# Patient Record
Sex: MALE | Race: WHITE | NOT HISPANIC OR LATINO | Employment: FULL TIME | URBAN - METROPOLITAN AREA
[De-identification: names, ages, dates, MRNs, and addresses within clinical notes are randomized per-mention and may not be internally consistent; named-entity substitution may affect disease eponyms.]

---

## 2020-01-25 ENCOUNTER — OFFICE VISIT (OUTPATIENT)
Dept: URGENT CARE | Facility: CLINIC | Age: 30
End: 2020-01-25
Payer: COMMERCIAL

## 2020-01-25 VITALS
HEIGHT: 69 IN | TEMPERATURE: 99.1 F | RESPIRATION RATE: 18 BRPM | OXYGEN SATURATION: 96 % | BODY MASS INDEX: 30.07 KG/M2 | SYSTOLIC BLOOD PRESSURE: 104 MMHG | DIASTOLIC BLOOD PRESSURE: 61 MMHG | HEART RATE: 100 BPM | WEIGHT: 203 LBS

## 2020-01-25 DIAGNOSIS — J02.9 ACUTE PHARYNGITIS, UNSPECIFIED ETIOLOGY: Primary | ICD-10-CM

## 2020-01-25 DIAGNOSIS — B34.9 VIRAL ILLNESS: ICD-10-CM

## 2020-01-25 LAB — S PYO AG THROAT QL: NEGATIVE

## 2020-01-25 PROCEDURE — 87880 STREP A ASSAY W/OPTIC: CPT | Performed by: FAMILY MEDICINE

## 2020-01-25 PROCEDURE — G0382 LEV 3 HOSP TYPE B ED VISIT: HCPCS | Performed by: FAMILY MEDICINE

## 2020-01-25 PROCEDURE — 99283 EMERGENCY DEPT VISIT LOW MDM: CPT | Performed by: FAMILY MEDICINE

## 2020-01-25 RX ORDER — BUPRENORPHINE HYDROCHLORIDE AND NALOXONE HYDROCHLORIDE DIHYDRATE 8; 2 MG/1; MG/1
1 TABLET SUBLINGUAL DAILY
COMMUNITY
End: 2020-09-04

## 2020-01-25 RX ORDER — TRAZODONE HYDROCHLORIDE 100 MG/1
100 TABLET ORAL
COMMUNITY
End: 2022-03-07 | Stop reason: ALTCHOICE

## 2020-01-25 RX ORDER — HYDROXYZINE 50 MG/1
50 TABLET, FILM COATED ORAL 3 TIMES DAILY PRN
COMMUNITY
End: 2022-03-07 | Stop reason: ALTCHOICE

## 2020-01-25 NOTE — PATIENT INSTRUCTIONS
F/u as needed  Most likely viral   Supportive care  OTC meds as needed  F/u with PCP if no improvement    Strep neg

## 2020-01-25 NOTE — PROGRESS NOTES
NAME: Agus Shepard is a 34 y o  male  : 1990    MRN: [id number]      Assessment and Plan   Acute pharyngitis, unspecified etiology [J02 9]  1  Acute pharyngitis, unspecified etiology  POCT rapid strepA   2  Viral illness             Patient Instructions   Patient Instructions   F/u as needed  Most likely viral   Supportive care  OTC meds as needed  F/u with PCP if no improvement  Strep neg    Proceed to ER if symptoms worsen  Chief Complaint     Chief Complaint   Patient presents with    Cold Like Symptoms     Patient here with sinus pressure and a headache  He took Ibuprofen for the headache  History of Present Illness   Here c/o dizziness, HA  Facial pressure  Symptoms for 2 days  Fever last night  Took ibuprofen and mucinex  Coughing  Sore throat  Denies SOB  Body aches      Review of Systems   Review of Systems   Constitutional: Positive for fever  Negative for fatigue  HENT: Positive for sore throat  Negative for ear pain and trouble swallowing  Respiratory: Positive for cough  Negative for chest tightness and shortness of breath  Cardiovascular: Negative for chest pain  Gastrointestinal: Positive for vomiting  Negative for abdominal pain, diarrhea and nausea  Genitourinary: Negative for difficulty urinating and dysuria  Skin: Negative for rash and wound  Neurological: Positive for weakness and headaches  Current Medications       Current Outpatient Medications:     buprenorphine-naloxone (SUBOXONE) 8-2 mg per SL tablet, Place 1 tablet under the tongue daily, Disp: , Rfl:     hydrOXYzine HCL (ATARAX) 50 mg tablet, Take 50 mg by mouth 3 (three) times a day as needed for itching, Disp: , Rfl:     traZODone (DESYREL) 100 mg tablet, Take 100 mg by mouth daily at bedtime, Disp: , Rfl:     Current Allergies     Allergies as of 2020    (No Known Allergies)              Past Medical History:   Diagnosis Date    Anxiety        History reviewed   No pertinent surgical history  History reviewed  No pertinent family history  Medications have been verified  The following portions of the patient's history were reviewed and updated as appropriate: allergies, current medications, past family history, past medical history, past social history, past surgical history and problem list     Objective   /61   Pulse 100   Temp 99 1 °F (37 3 °C) (Tympanic)   Resp 18   Ht 5' 9" (1 753 m)   Wt 92 1 kg (203 lb)   SpO2 96%   BMI 29 98 kg/m²      Physical Exam     Physical Exam   Constitutional: He is oriented to person, place, and time  He appears well-developed and well-nourished  HENT:   Head: Normocephalic  Mouth/Throat: No oropharyngeal exudate  Eyes: EOM are normal    Neck: Normal range of motion  Cardiovascular: Normal rate, regular rhythm and normal heart sounds  Exam reveals no gallop and no friction rub  No murmur heard  Pulmonary/Chest: Effort normal and breath sounds normal  No respiratory distress  He has no wheezes  He has no rales  Abdominal: Soft  Bowel sounds are normal  He exhibits no distension  There is no tenderness  There is no rebound and no guarding  Musculoskeletal: Normal range of motion  Lymphadenopathy:     He has cervical adenopathy  Neurological: He is oriented to person, place, and time  Skin: Skin is warm and dry  No rash noted  Psychiatric: He has a normal mood and affect  Thought content normal    Nursing note and vitals reviewed

## 2020-07-07 ENCOUNTER — OFFICE VISIT (OUTPATIENT)
Dept: URGENT CARE | Facility: CLINIC | Age: 30
End: 2020-07-07
Payer: COMMERCIAL

## 2020-07-07 VITALS
TEMPERATURE: 97.3 F | DIASTOLIC BLOOD PRESSURE: 76 MMHG | BODY MASS INDEX: 27.91 KG/M2 | HEIGHT: 69 IN | SYSTOLIC BLOOD PRESSURE: 130 MMHG | OXYGEN SATURATION: 99 % | HEART RATE: 106 BPM | WEIGHT: 188.4 LBS | RESPIRATION RATE: 16 BRPM

## 2020-07-07 DIAGNOSIS — S05.02XA ABRASION OF LEFT CORNEA, INITIAL ENCOUNTER: Primary | ICD-10-CM

## 2020-07-07 PROCEDURE — 99283 EMERGENCY DEPT VISIT LOW MDM: CPT | Performed by: PREVENTIVE MEDICINE

## 2020-07-07 PROCEDURE — G0382 LEV 3 HOSP TYPE B ED VISIT: HCPCS | Performed by: PREVENTIVE MEDICINE

## 2020-07-07 RX ORDER — TOBRAMYCIN 3 MG/ML
2 SOLUTION/ DROPS OPHTHALMIC
Status: DISCONTINUED | OUTPATIENT
Start: 2020-07-07 | End: 2020-09-04

## 2020-07-08 NOTE — PATIENT INSTRUCTIONS
Use the drops 4 times a day for the next 2 days  If the eye feels irritated tonight you can tape the lid shut     Recheck if no improvement in 48 hours

## 2020-07-08 NOTE — PROGRESS NOTES
[facility]        NAME: Delano Nuno is a 27 y o  male  : 1990    MRN: [id number]  DATE: 2020  TIME: 8:52 PM    Assessment and Plan   Abrasion of left cornea, initial encounter [S05  02XA]  1  Abrasion of left cornea, initial encounter           Patient Instructions       Follow up with PCP in 3-5 days  Proceed to  ER if symptoms worsen  Chief Complaint     Chief Complaint   Patient presents with    Foreign Body in Eye     Pt working on car, fb entered L eye  Pt reports being able to see the fb, discomfort 6/10  Pt irrigated eye with saline  History of Present Illness       Patient was working on a car when he felt a foreign body and reside  Review of Systems   Review of Systems   Eyes: Positive for pain  Current Medications       Current Outpatient Medications:     buprenorphine-naloxone (SUBOXONE) 8-2 mg per SL tablet, Place 1 tablet under the tongue daily, Disp: , Rfl:     hydrOXYzine HCL (ATARAX) 50 mg tablet, Take 50 mg by mouth 3 (three) times a day as needed for itching, Disp: , Rfl:     traZODone (DESYREL) 100 mg tablet, Take 100 mg by mouth daily at bedtime, Disp: , Rfl:     Current Allergies     Allergies as of 2020    (No Known Allergies)            The following portions of the patient's history were reviewed and updated as appropriate: allergies, current medications, past family history, past medical history, past social history, past surgical history and problem list      Past Medical History:   Diagnosis Date    Anxiety        Past Surgical History:   Procedure Laterality Date    WISDOM TOOTH EXTRACTION         History reviewed  No pertinent family history  Medications have been verified          Objective   /76 (BP Location: Right arm, Patient Position: Sitting)   Pulse (!) 106   Temp (!) 97 3 °F (36 3 °C) (Oral)   Resp 16   Ht 5' 9" (1 753 m)   Wt 85 5 kg (188 lb 6 4 oz)   SpO2 99%   BMI 27 82 kg/m²        Physical Exam Physical Exam   Eyes:   No foreign body noted with the naked eye over the sclera the conjunctiva work or under the upper or lower lid  Fluorescein stain reveals a corneal abrasion over the lower pupil area

## 2020-09-04 ENCOUNTER — OFFICE VISIT (OUTPATIENT)
Dept: FAMILY MEDICINE CLINIC | Facility: CLINIC | Age: 30
End: 2020-09-04
Payer: COMMERCIAL

## 2020-09-04 VITALS
DIASTOLIC BLOOD PRESSURE: 86 MMHG | SYSTOLIC BLOOD PRESSURE: 146 MMHG | WEIGHT: 177.8 LBS | HEART RATE: 100 BPM | BODY MASS INDEX: 26.33 KG/M2 | HEIGHT: 69 IN | OXYGEN SATURATION: 99 % | TEMPERATURE: 98 F

## 2020-09-04 DIAGNOSIS — Z13.6 SCREENING FOR CARDIOVASCULAR CONDITION: ICD-10-CM

## 2020-09-04 DIAGNOSIS — Z11.4 SCREENING FOR HIV (HUMAN IMMUNODEFICIENCY VIRUS): ICD-10-CM

## 2020-09-04 DIAGNOSIS — Z13.1 SCREENING FOR DIABETES MELLITUS: ICD-10-CM

## 2020-09-04 DIAGNOSIS — B18.2 CHRONIC HEPATITIS C WITHOUT HEPATIC COMA (HCC): Primary | ICD-10-CM

## 2020-09-04 DIAGNOSIS — Z13.29 SCREENING FOR THYROID DISORDER: ICD-10-CM

## 2020-09-04 PROCEDURE — 99203 OFFICE O/P NEW LOW 30 MIN: CPT | Performed by: NURSE PRACTITIONER

## 2020-09-04 PROCEDURE — 3725F SCREEN DEPRESSION PERFORMED: CPT | Performed by: NURSE PRACTITIONER

## 2020-09-04 NOTE — PROGRESS NOTES
Assessment/Plan   Problem List Items Addressed This Visit     None      Visit Diagnoses     Chronic hepatitis C without hepatic coma ([facility] [address] )    -  Primary    Relevant Orders    Ambulatory referral to Gastroenterology    Screening for cardiovascular condition        Relevant Orders    CBC and differential    Lipid Panel with Direct LDL reflex    Screening for diabetes mellitus        Relevant Orders    Comprehensive metabolic panel    Screening for HIV (human immunodeficiency virus)        Relevant Orders    Human Immunodeficiency Virus 1/2 Antigen / Antibody ( Fourth Generation) with Reflex Testing    Screening for thyroid disorder        Relevant Orders    TSH, 3rd generation with Free T4 reflex                Chief Complaint   Patient presents with    new patient     stablish care     Anxiety       Subjective   Patient ID: Geovanny Gan is a 27 y o  male  Vitals:    09/04/20 0809   BP: 146/86   Pulse: 100   Temp: 98 °F (36 7 °C)   SpO2: 99%     Here today to establish care with this practice  He has recently moved back into this area  Does have a history of substance abuse was on suboxone but stopped 3 months ago, did not offer reason  Reports has been "clean" for many moths     Does admit to a history of Hep C - referral to GI for treatment        Anxiety   Presents for initial visit  Onset was 1 to 6 months ago  The problem has been waxing and waning  Symptoms include compulsions, decreased concentration, depressed mood, excessive worry, insomnia, irritability, muscle tension, nervous/anxious behavior, palpitations and panic  Symptoms occur most days  The severity of symptoms is moderate  The symptoms are aggravated by work stress  The quality of sleep is fair  Nighttime awakenings: occasional      Risk factors: history of sunbtance abuse  His past medical history is significant for anxiety/panic attacks  There is no history of suicide attempts  Past treatments include SSRIs and lifestyle changes   The treatment provided no relief  Compliance with prior treatments has been variable  Past compliance problems: Patient has stopped taking all previously prescribed medication for anxiety, he does have an appointment with [facility] on 3  days will await their guidance patient does not want medications to be initiated at this time  The following portions of the patient's history were reviewed and updated as appropriate: allergies, current medications, past family history, past social history, past surgical history and problem list     Review of Systems   Constitutional: Positive for irritability  Negative for fatigue and fever  HENT: Negative  Eyes: Negative  Respiratory: Negative  Cardiovascular: Positive for palpitations  Gastrointestinal: Negative  Endocrine: Negative  Genitourinary: Negative  Musculoskeletal: Negative  Allergic/Immunologic: Negative  Neurological: Negative  Hematological: Negative  Psychiatric/Behavioral: Positive for decreased concentration  The patient is nervous/anxious and has insomnia  Objective     Physical Exam  Vitals signs and nursing note reviewed  Constitutional:       General: He is in acute distress (anxious)  Appearance: Normal appearance  He is normal weight  HENT:      Head: Normocephalic and atraumatic  Right Ear: Tympanic membrane, ear canal and external ear normal  There is no impacted cerumen  Left Ear: Tympanic membrane, ear canal and external ear normal  There is no impacted cerumen  Nose: Nose normal    Eyes:      General:         Right eye: No discharge  Left eye: No discharge  Extraocular Movements: Extraocular movements intact  Conjunctiva/sclera: Conjunctivae normal    Neck:      Musculoskeletal: Normal range of motion and neck supple  Cardiovascular:      Rate and Rhythm: Normal rate and regular rhythm     Pulmonary:      Effort: Pulmonary effort is normal  No respiratory distress  Breath sounds: Normal breath sounds  No wheezing  Abdominal:      General: Abdomen is flat  Bowel sounds are normal       Palpations: Abdomen is soft  Musculoskeletal: Normal range of motion  Skin:     General: Skin is warm and dry  Neurological:      Mental Status: He is alert and oriented to person, place, and time  Psychiatric:         Attention and Perception: Attention and perception normal          Mood and Affect: Mood is anxious  Speech: Speech normal          Behavior: Behavior normal  Behavior is cooperative  Thought Content: Thought content normal          Cognition and Memory: Cognition and memory normal          Judgment: Judgment normal  Judgment is not impulsive or inappropriate        Comments: Feels the need to pace to relieve nervous energy

## 2020-09-04 NOTE — PATIENT INSTRUCTIONS
Anxiety   WHAT YOU NEED TO KNOW:   Anxiety is a condition that causes you to feel extremely worried or nervous  The feelings are so strong that they can cause problems with your daily activities or sleep  Anxiety may be triggered by something you fear, or it may happen without a cause  Family or work stress, smoking, caffeine, and alcohol can increase your risk for anxiety  Certain medicines or health conditions can also increase your risk  Anxiety can become a long-term condition if it is not managed or treated  DISCHARGE INSTRUCTIONS:   Call 911 if:   · You have chest pain, tightness, or heaviness that may spread to your shoulders, arms, jaw, neck, or back  · You feel like hurting yourself or someone else  Contact your healthcare provider if:   · Your symptoms get worse or do not get better with treatment  · Your anxiety keeps you from doing your regular daily activities  · You have new symptoms since your last visit  · You have questions or concerns about your condition or care  Medicines:   · Medicines  may be given to help you feel more calm and relaxed, and decrease your symptoms  · Take your medicine as directed  Contact your healthcare provider if you think your medicine is not helping or if you have side effects  Tell him of her if you are allergic to any medicine  Keep a list of the medicines, vitamins, and herbs you take  Include the amounts, and when and why you take them  Bring the list or the pill bottles to follow-up visits  Carry your medicine list with you in case of an emergency  Follow up with your healthcare provider within 2 weeks or as directed:  Write down your questions so you remember to ask them during your visits  Manage anxiety:   · Talk to someone about your anxiety  Your healthcare provider may suggest counseling  Cognitive behavioral therapy can help you understand and change how you react to events that trigger your symptoms   You might feel more comfortable talking with a friend or family member about your anxiety  Choose someone you know will be supportive and encouraging  · Find ways to relax  Activities such as exercise, meditation, or listening to music can help you relax  Spend time with friends, or do things you enjoy  · Practice deep breathing  Deep breathing can help you relax when you feel anxious  Focus on taking slow, deep breaths several times a day, or during an anxiety attack  Breathe in through your nose and out through your mouth  · Create a regular sleep routine  Regular sleep can help you feel calmer during the day  Go to sleep and wake up at the same times every day  Do not watch television or use the computer right before bed  Your room should be comfortable, dark, and quiet  · Eat a variety of healthy foods  Healthy foods include fruits, vegetables, low-fat dairy products, lean meats, fish, whole-grain breads, and cooked beans  Healthy foods can help you feel less anxious and have more energy  · Exercise regularly  Exercise can increase your energy level  Exercise may also lift your mood and help you sleep better  Your healthcare provider can help you create an exercise plan  · Do not smoke  Nicotine and other chemicals in cigarettes and cigars can increase anxiety  Ask your healthcare provider for information if you currently smoke and need help to quit  E-cigarettes or smokeless tobacco still contain nicotine  Talk to your healthcare provider before you use these products  · Do not have caffeine  Caffeine can make your symptoms worse  Do not have foods or drinks that are meant to increase your energy level  · Limit or do not drink alcohol  Ask your healthcare provider if alcohol is safe for you  You may not be able to drink alcohol if you take certain anxiety or depression medicines  Limit alcohol to 1 drink per day if you are a woman  Limit alcohol to 2 drinks per day if you are a man   A drink of alcohol is 12 ounces of beer, 5 ounces of wine, or 1½ ounces of liquor  · Do not use drugs  Drugs can make your anxiety worse  It can also make anxiety hard to manage  Talk to your healthcare provider if you use drugs and want help to quit  © 2017 [address] Information is for End User's use only and may not be sold, redistributed or otherwise used for commercial purposes  All illustrations and images included in CareNotes® are the copyrighted property of A D A M , [employer]  or Kane Cassidy  The above information is an  only  It is not intended as medical advice for individual conditions or treatments  Talk to your doctor, nurse or pharmacist before following any medical regimen to see if it is safe and effective for you

## 2020-09-15 ENCOUNTER — CONSULT (OUTPATIENT)
Dept: GASTROENTEROLOGY | Facility: CLINIC | Age: 30
End: 2020-09-15
Payer: COMMERCIAL

## 2020-09-15 VITALS
SYSTOLIC BLOOD PRESSURE: 140 MMHG | HEIGHT: 69 IN | WEIGHT: 182 LBS | BODY MASS INDEX: 26.96 KG/M2 | DIASTOLIC BLOOD PRESSURE: 78 MMHG

## 2020-09-15 DIAGNOSIS — B18.2 CHRONIC HEPATITIS C WITHOUT HEPATIC COMA (HCC): ICD-10-CM

## 2020-09-15 DIAGNOSIS — R63.4 WEIGHT LOSS: Primary | ICD-10-CM

## 2020-09-15 PROCEDURE — 99243 OFF/OP CNSLTJ NEW/EST LOW 30: CPT | Performed by: NURSE PRACTITIONER

## 2020-09-15 NOTE — PROGRESS NOTES
[facility] - Outpatient Consultation  Vicki Rudd 27 y o  male MRN: [id number]  Encounter: [id number]    ASSESSMENT AND PLAN:      1  Chronic hepatitis C without hepatic coma (HCC)  Hepatitis-C antibody and Hep C viral load positive in 2019  He has not received any treatment for hepatitis C in the past   Patient has a history of previous IV heroin abuse and was previously on Suboxone (he stopped taking this medication about 3 months ago)  His last IV drug use was in October 2018  He also was incarcerated for 14 months  Will check:   - Ambulatory referral to Gastroenterology  - CBC and differential; Future  - Hepatitis C genotype; Future  - Hepatitis C antibody; Future  - Hepatitis C RNA, quantitative, PCR; Future  - US abdomen complete; Future  - Hepatitis A antibody, total; Future  - Hepatitis B core antibody, total; Future  - Hepatitis B surface antibody; Future  - Hepatitis B surface antigen; Future  - Human Immunodeficiency Virus 1/2 Antigen / Antibody ( Fourth Generation) with Reflex Testing; Future  - HCV FIBROSURE; Future  - Protime-INR; Future    Based on lab results, will determine appropriate Hep C treatment  2  Weight loss  Patient reports recent 15 lb weight loss in the past several months  He does report that he started a new job in a [employer] and is eating less throughout the day  - Comprehensive metabolic panel; Future  - TSH, 3rd generation with Free T4 reflex; Future  - will continue to monitor weight closely       Followup Appointment: 8 weeks   ______________________________________________________________________    Chief Complaint   Patient presents with    Hep b treatment       HPI:   Vicki Rudd is a 27y o  year old male who presents to the office today as a new patient  He was referred to us by his PCP Zelalem May, [address]  He reports a history of Hepatitis C (had labwork in 2019)   He was previously incarcerated for approximately 14 months and also has a history of previous IV drug abuse (heroin) and was previously on Suboxone  He also has several tattoos  He is currently taking Trazodone and Atarax  He reports that he had lab work completed when he was released from retirement and was told that Hep C antibodies were positive  He has not been treated for Hep C in the past  He denies any fever, chills, upper GI symptoms, jaundice, abdominal pain, nausea, vomiting, change in bowel habits, hematochezia, melena  Historical Information   Past Medical History:   Diagnosis Date    Anxiety      Past Surgical History:   Procedure Laterality Date    WISDOM TOOTH EXTRACTION       Social History     Substance and Sexual Activity   Alcohol Use Never    Frequency: Never     Social History     Substance and Sexual Activity   Drug Use Never     Social History     Tobacco Use   Smoking Status Current Every Day Smoker    Packs/day: 0 25    Types: Cigarettes   Smokeless Tobacco Former User     Family History   Adopted: Yes       Meds/Allergies     Current Outpatient Medications:     hydrOXYzine HCL (ATARAX) 50 mg tablet    traZODone (DESYREL) 100 mg tablet    No Known Allergies    PHYSICAL EXAM:    Blood pressure 140/78, height 5' 9" (1 753 m), weight 82 6 kg (182 lb)  Body mass index is 26 88 kg/m²  General Appearance: NAD, cooperative, alert  Eyes: Anicteric, PERRLA, EOMI  ENT:  Normocephalic, atraumatic, normal mucosa  Neck:  Supple, symmetrical, trachea midline,   Resp:  Clear to auscultation bilaterally; no rales, rhonchi or wheezing; respirations unlabored   CV:  S1 S2, Regular rate and rhythm; no murmur, rub, or gallop  GI:  Soft, non-tender, non-distended; normal bowel sounds; no masses, no organomegaly   Rectal: Deferred  Musculoskeletal: No cyanosis, clubbing or edema  Normal ROM    Skin:  No jaundice, rashes, or lesions   Heme/Lymph: No palpable cervical lymphadenopathy  Psych: Normal affect, good eye contact, +anxious   Neuro: No gross deficits, AAOx3    Lab Results:   No results found for: WBC, HGB, HCT, MCV, PLT  No results found for: NA, K, CL, CO2, ANIONGAP, BUN, CREATININE, GLUCOSE, GLUF, CALCIUM, CORRECTEDCA, AST, ALT, ALKPHOS, PROT, BILITOT, EGFR  No results found for: IRON, TIBC, FERRITIN  No results found for: LIPASE    Radiology Results:   No results found  REVIEW OF SYSTEMS:    CONSTITUTIONAL: Denies any fever, chills, rigors, and weight loss  HEENT: No earache or tinnitus  Denies hearing loss or visual disturbances  CARDIOVASCULAR: No chest pain or palpitations  RESPIRATORY: Denies any cough, hemoptysis, shortness of breath or dyspnea on exertion  GASTROINTESTINAL: As noted in the History of Present Illness  GENITOURINARY: No problems with urination  Denies any hematuria or dysuria  NEUROLOGIC: No dizziness or vertigo, denies headaches  MUSCULOSKELETAL: Denies any muscle or joint pain  SKIN: Denies skin rashes or itching  ENDOCRINE: Denies excessive thirst  Denies intolerance to heat or cold  PSYCHOSOCIAL: Denies depression or anxiety  Denies any recent memory loss

## 2020-09-15 NOTE — PATIENT INSTRUCTIONS
Check labs and ultrasound   We will contact you with lab results and next steps     Follow up in 8 weeks

## 2020-11-17 ENCOUNTER — HOSPITAL ENCOUNTER (EMERGENCY)
Facility: HOSPITAL | Age: 30
Discharge: HOME | End: 2020-11-17
Payer: COMMERCIAL

## 2020-11-17 VITALS
DIASTOLIC BLOOD PRESSURE: 84 MMHG | OXYGEN SATURATION: 100 % | SYSTOLIC BLOOD PRESSURE: 152 MMHG | HEART RATE: 106 BPM | WEIGHT: 190 LBS | BODY MASS INDEX: 28.14 KG/M2 | TEMPERATURE: 97.4 F | HEIGHT: 69 IN | RESPIRATION RATE: 18 BRPM

## 2020-11-17 DIAGNOSIS — T40.1X1A HEROIN OVERDOSE (HCC): Primary | ICD-10-CM

## 2020-11-17 DIAGNOSIS — F19.10 DRUG ABUSE (HCC): ICD-10-CM

## 2020-11-17 DIAGNOSIS — T50.901A ACCIDENTAL DRUG OVERDOSE, INITIAL ENCOUNTER: ICD-10-CM

## 2020-11-17 PROCEDURE — 99282 EMERGENCY DEPT VISIT SF MDM: CPT | Performed by: EMERGENCY MEDICINE

## 2020-11-17 PROCEDURE — 99284 EMERGENCY DEPT VISIT MOD MDM: CPT

## 2020-12-20 ENCOUNTER — HOSPITAL ENCOUNTER (EMERGENCY)
Facility: HOSPITAL | Age: 30
Discharge: HOME | End: 2020-12-20
Attending: EMERGENCY MEDICINE | Admitting: EMERGENCY MEDICINE
Payer: COMMERCIAL

## 2020-12-20 VITALS
HEART RATE: 74 BPM | RESPIRATION RATE: 16 BRPM | OXYGEN SATURATION: 99 % | SYSTOLIC BLOOD PRESSURE: 159 MMHG | DIASTOLIC BLOOD PRESSURE: 97 MMHG | TEMPERATURE: 97.1 F

## 2020-12-20 DIAGNOSIS — T40.1X1A ACCIDENTAL OVERDOSE OF HEROIN, INITIAL ENCOUNTER (HCC): Primary | ICD-10-CM

## 2020-12-20 PROCEDURE — 99284 EMERGENCY DEPT VISIT MOD MDM: CPT

## 2020-12-20 PROCEDURE — 99282 EMERGENCY DEPT VISIT SF MDM: CPT | Performed by: EMERGENCY MEDICINE

## 2020-12-20 RX ORDER — NALOXONE HYDROCHLORIDE 1 MG/ML
INJECTION INTRAMUSCULAR; INTRAVENOUS; SUBCUTANEOUS
Status: COMPLETED
Start: 2020-12-20 | End: 2020-12-20

## 2020-12-20 RX ADMIN — NALOXONE HYDROCHLORIDE: 1 INJECTION PARENTERAL at 11:14

## 2021-01-01 ENCOUNTER — HOSPITAL ENCOUNTER (EMERGENCY)
Facility: HOSPITAL | Age: 31
Discharge: HOME | End: 2021-01-01
Attending: EMERGENCY MEDICINE | Admitting: EMERGENCY MEDICINE
Payer: COMMERCIAL

## 2021-01-01 VITALS
DIASTOLIC BLOOD PRESSURE: 73 MMHG | BODY MASS INDEX: 27.4 KG/M2 | HEIGHT: 69 IN | WEIGHT: 185 LBS | HEART RATE: 97 BPM | RESPIRATION RATE: 18 BRPM | OXYGEN SATURATION: 96 % | TEMPERATURE: 95.8 F | SYSTOLIC BLOOD PRESSURE: 144 MMHG

## 2021-01-01 DIAGNOSIS — T50.901A ACCIDENTAL OVERDOSE, INITIAL ENCOUNTER: Primary | ICD-10-CM

## 2021-01-01 DIAGNOSIS — R03.0 ELEVATED BLOOD PRESSURE READING: ICD-10-CM

## 2021-01-01 PROCEDURE — 99282 EMERGENCY DEPT VISIT SF MDM: CPT | Performed by: PHYSICIAN ASSISTANT

## 2021-01-01 PROCEDURE — 99284 EMERGENCY DEPT VISIT MOD MDM: CPT

## 2021-01-01 RX ORDER — NALOXONE HYDROCHLORIDE 1 MG/ML
1 INJECTION PARENTERAL ONCE
Status: COMPLETED | OUTPATIENT
Start: 2021-01-01 | End: 2021-01-01

## 2021-01-01 NOTE — ED PROVIDER NOTES
History  Chief Complaint   Patient presents with    Overdose - Accidental     Pt states that he used two small bags of heroin from a different supplier  As per ems pt was given 2mg of narcan en route and is now awake and alert  Pt denies any pain at this time  Pt denies any other symptoms  Pt states he normally  uses one larger bag but this time snorted two smaller bags,     66-year-old male brought in by EMS after accidental heroin overdose  Patient states that he snorted 2 small bags of heroin prior to arrival and then became unresponsive  He was given 2 mg IV Narcan by EMS and became responsive  Patient states he is not a daily user  He reports both IV heroin use and snorting heroin sporadically  States he usually does 1 bag at a time  Denies complaints  Prior to Admission Medications   Prescriptions Last Dose Informant Patient Reported? Taking?   hydrOXYzine HCL (ATARAX) 50 mg tablet  Self Yes No   Sig: Take 50 mg by mouth 3 (three) times a day as needed for itching   traZODone (DESYREL) 100 mg tablet  Self Yes No   Sig: Take 100 mg by mouth daily at bedtime      Facility-Administered Medications: None       Past Medical History:   Diagnosis Date    Anxiety     Hepatitis C        Past Surgical History:   Procedure Laterality Date    WISDOM TOOTH EXTRACTION         Family History   Adopted: Yes     I have reviewed and agree with the history as documented  E-Cigarette/Vaping    E-Cigarette Use Never User      E-Cigarette/Vaping Substances    Nicotine No     THC No     CBD No     Flavoring No     Other No     Unknown No      Social History     Tobacco Use    Smoking status: Current Every Day Smoker     Packs/day: 0 50     Types: Cigarettes    Smokeless tobacco: Former User   Substance Use Topics    Alcohol use: Never     Frequency: Never    Drug use: Yes     Types: Heroin     Comment: 1/2 bag daily       Review of Systems   All other systems reviewed and are negative        Physical Exam  Physical Exam  Vitals signs and nursing note reviewed  Constitutional:       General: He is not in acute distress  Appearance: Normal appearance  He is well-developed  He is not ill-appearing, toxic-appearing or diaphoretic  HENT:      Head: Normocephalic and atraumatic  Eyes:      Conjunctiva/sclera: Conjunctivae normal       Comments: EOM grossly intact, no pinpoint pupils   Neck:      Musculoskeletal: Normal range of motion and neck supple  Vascular: No JVD  Cardiovascular:      Rate and Rhythm: Normal rate  Heart sounds: No murmur  Pulmonary:      Effort: Pulmonary effort is normal  No respiratory distress  Breath sounds: No wheezing  Abdominal:      Palpations: Abdomen is soft  Musculoskeletal:      Comments: FROM, steady gait, cap refill brisk, strength and sensation grossly intact throughout   Skin:     General: Skin is warm and dry  Capillary Refill: Capillary refill takes less than 2 seconds  Comments: Multiple tattoos   Neurological:      Mental Status: He is alert and oriented to person, place, and time     Psychiatric:         Behavior: Behavior normal          Vital Signs  ED Triage Vitals [01/01/21 1709]   Temperature Pulse Respirations Blood Pressure SpO2   (!) 95 8 °F (35 4 °C) 90 18 (!) 180/93 100 %      Temp src Heart Rate Source Patient Position - Orthostatic VS BP Location FiO2 (%)   -- Monitor Lying Left arm --      Pain Score       --           Vitals:    01/01/21 1709 01/01/21 1713   BP: (!) 180/93 (!) 175/104   Pulse: 90 85   Patient Position - Orthostatic VS: Lying Sitting         Visual Acuity      ED Medications  Medications   naloxone (FOR EMS ONLY) (NARCAN) 2 MG/2ML injection 2 mg (0 mg Does not apply Given to EMS 1/1/21 1710)       Diagnostic Studies  Results Reviewed     None                 No orders to display              Procedures  Procedures         ED Course  ED Course as of Jan 01 1803 Fri Jan 01, 2021   1759 Pt up and ambulating to the bathroom, aao                                              [facility]  Number of Diagnoses or Management Options  Diagnosis management comments: 28 yo M presenting for evaluation after accidental overdose, pt was given 2mg IV narcan after snorting 2 small bags of heroin prior to arrival, pt has been awake and alert here, sat 99% on RA, elevated bp without hx of this, will advise to f/u with pcp regarding this, observed for 1 hour without change of mental status    strict return to ED precautions discussed  Pt verbalizes understanding and agrees with plan  Pt is stable for discharge    Portions of the record may have been created with voice recognition software  Occasional wrong word or "sound a like" substitutions may have occurred due to the inherent limitations of voice recognition software  Read the chart carefully and recognize, using context, where substitutions have occurred  Disposition  Final diagnoses:   Accidental overdose, initial encounter   Elevated blood pressure reading     Time reflects when diagnosis was documented in both MDM as applicable and the Disposition within this note     Time User Action Codes Description Comment    1/1/2021  6:02 PM RONEY Freitas 261 Accidental overdose, initial encounter     1/1/2021  6:02 PM Shanti Rabago Add [R03 0] Elevated blood pressure reading       ED Disposition     ED Disposition Condition Date/Time Comment    Discharge Stable Fri Jan 1, 2021  6:02 PM Cl Vilchis discharge to home/self care              Follow-up Information     Follow up With Specialties Details Why [address], [address] Nurse Practitioner Call in 1 day  Jose R STODDARD [phone]      [address] Emergency Department Emergency Medicine Go to  If symptoms worsen [address] [address] [phone]          Patient's Medications   Discharge Prescriptions    No medications on file     No discharge procedures on file      PDMP Review     None          ED Provider  Electronically Signed by           Flor Vega PA-C  01/01/21 7047

## 2022-03-01 ENCOUNTER — APPOINTMENT (EMERGENCY)
Dept: RADIOLOGY | Facility: HOSPITAL | Age: 32
End: 2022-03-01
Payer: COMMERCIAL

## 2022-03-01 ENCOUNTER — HOSPITAL ENCOUNTER (EMERGENCY)
Facility: HOSPITAL | Age: 32
Discharge: HOME | End: 2022-03-02
Attending: EMERGENCY MEDICINE
Payer: COMMERCIAL

## 2022-03-01 DIAGNOSIS — S62.309A METACARPAL BONE FRACTURE: Primary | ICD-10-CM

## 2022-03-01 PROCEDURE — 73110 X-RAY EXAM OF WRIST: CPT

## 2022-03-01 PROCEDURE — 99284 EMERGENCY DEPT VISIT MOD MDM: CPT | Performed by: EMERGENCY MEDICINE

## 2022-03-01 PROCEDURE — 73130 X-RAY EXAM OF HAND: CPT

## 2022-03-01 PROCEDURE — 99283 EMERGENCY DEPT VISIT LOW MDM: CPT

## 2022-03-01 PROCEDURE — 29125 APPL SHORT ARM SPLINT STATIC: CPT | Performed by: EMERGENCY MEDICINE

## 2022-03-01 NOTE — Clinical Note
Charline Lopez was seen and treated in our emergency department on 3/1/2022  No use of right hand    Diagnosis:     Royal Mclaughlin    He may return on this date: 03/03/2022    Light duty only  No use of right hand  Follow up with orthopedics for additional work clearance  If you have any questions or concerns, please don't hesitate to call        Katie Lui RN    ______________________________           _______________          _______________  Hospital Representative                              Date                                Time

## 2022-03-02 VITALS
BODY MASS INDEX: 27.9 KG/M2 | OXYGEN SATURATION: 96 % | RESPIRATION RATE: 19 BRPM | SYSTOLIC BLOOD PRESSURE: 134 MMHG | HEART RATE: 100 BPM | WEIGHT: 188.93 LBS | DIASTOLIC BLOOD PRESSURE: 84 MMHG | TEMPERATURE: 99 F

## 2022-03-02 NOTE — ED PROVIDER NOTES
History  Chief Complaint   Patient presents with    Hand Injury     pt punched wall and now complains of R hand and wrist pain  pt having manic behavior in triage  reports he is on saboxone      Patient is a 80-year-old male past medical history significant for drug use currently on Suboxone  Presenting to the emergency department with chief complaint of right hand pain after punching a wall  Patient states he got pissed off and punched a wall  Patient denies any other injury  Patient believes he broke his hand  Patient states he did not take anything for his pain came straight to the hospital for evaluation  Patient denies any numbness or tingling in the extremities able to move it but with pain  Patient states he is up-to-date with tetanus shot  Prior to Admission Medications   Prescriptions Last Dose Informant Patient Reported? Taking?   hydrOXYzine HCL (ATARAX) 50 mg tablet  Self Yes No   Sig: Take 50 mg by mouth 3 (three) times a day as needed for itching   traZODone (DESYREL) 100 mg tablet  Self Yes No   Sig: Take 100 mg by mouth daily at bedtime      Facility-Administered Medications: None       Past Medical History:   Diagnosis Date    Anxiety     Hepatitis C        Past Surgical History:   Procedure Laterality Date    WISDOM TOOTH EXTRACTION         Family History   Adopted: Yes     I have reviewed and agree with the history as documented      E-Cigarette/Vaping    E-Cigarette Use Never User      E-Cigarette/Vaping Substances    Nicotine No     THC No     CBD No     Flavoring No     Other No     Unknown No      Social History     Tobacco Use    Smoking status: Current Every Day Smoker     Packs/day: 0 50     Types: Cigarettes    Smokeless tobacco: Former User   Vaping Use    Vaping Use: Never used   Substance Use Topics    Alcohol use: Never    Drug use: Yes     Types: Heroin     Comment: 1/2 bag daily        Review of Systems   Constitutional: Negative for activity change, chills and fever  HENT: Negative for congestion  Respiratory: Negative for cough and shortness of breath  Cardiovascular: Negative for chest pain and palpitations  Gastrointestinal: Negative for abdominal pain, constipation, diarrhea, nausea and vomiting  Musculoskeletal: Positive for joint swelling  Negative for arthralgias and back pain  Skin: Negative for color change and rash  Neurological: Negative for dizziness, seizures, syncope, weakness, light-headedness and headaches  Psychiatric/Behavioral: Negative for agitation and confusion  All other systems reviewed and are negative  Physical Exam  ED Triage Vitals [03/01/22 2214]   Temperature Pulse Respirations Blood Pressure SpO2   99 1 °F (37 3 °C) (!) 131 20 136/91 96 %      Temp Source Heart Rate Source Patient Position - Orthostatic VS BP Location FiO2 (%)   Oral Monitor -- -- --      Pain Score       --             Orthostatic Vital Signs  Vitals:    03/01/22 2214   BP: 136/91   Pulse: (!) 131       Physical Exam  Vitals and nursing note reviewed  Constitutional:       General: He is not in acute distress  Appearance: Normal appearance  He is well-developed  He is not ill-appearing  HENT:      Head: Normocephalic and atraumatic  Right Ear: External ear normal       Left Ear: External ear normal       Nose: Nose normal       Mouth/Throat:      Mouth: Mucous membranes are moist    Eyes:      Extraocular Movements: Extraocular movements intact  Conjunctiva/sclera: Conjunctivae normal       Pupils: Pupils are equal, round, and reactive to light  Cardiovascular:      Rate and Rhythm: Normal rate and regular rhythm  Heart sounds: Normal heart sounds  No murmur heard  Pulmonary:      Effort: Pulmonary effort is normal  No respiratory distress  Breath sounds: Normal breath sounds  Abdominal:      General: Abdomen is flat  Palpations: Abdomen is soft  Tenderness: There is no abdominal tenderness  There is no guarding or rebound  Musculoskeletal:      Right shoulder: Normal       Left shoulder: Normal       Right upper arm: Normal       Left upper arm: Normal       Right elbow: Normal       Left elbow: Normal       Right forearm: Tenderness present  No swelling  Left forearm: Normal       Right wrist: Swelling and tenderness present  No snuff box tenderness  Decreased range of motion  Normal pulse  Left wrist: Normal       Right hand: Deformity and bony tenderness present  Normal sensation  Normal capillary refill  Normal pulse  Cervical back: Normal range of motion and neck supple  Comments: No radial head tenderness to palpation bilaterally   Skin:     General: Skin is warm and dry  Capillary Refill: Capillary refill takes less than 2 seconds  Neurological:      General: No focal deficit present  Mental Status: He is alert and oriented to person, place, and time  Psychiatric:         Mood and Affect: Mood normal          Behavior: Behavior normal          ED Medications  Medications - No data to display    Diagnostic Studies  Results Reviewed     None                 XR hand 3+ views RIGHT   ED Interpretation by Huyen Torres DO (03/01 2259)   5th metacarp fracture proximal      XR wrist 3+ views RIGHT    (Results Pending)         Procedures  Splint application    Date/Time: 3/1/2022 11:52 PM  Performed by: Huyen Torres DO  Authorized by: Huyen Torres DO   Universal Protocol:  Consent: Verbal consent obtained  Risks and benefits: risks, benefits and alternatives were discussed  Consent given by: patient  Required items: required blood products, implants, devices, and special equipment available  Patient identity confirmed: verbally with patient      Pre-procedure details:     Sensation:  Normal  Procedure details:     Laterality:  Right    Location:  Hand    Hand:  R hand    Strapping: no  Cast type: ulnar gutter splint      Splint type: Ulnar gutter    Supplies:  Cotton padding, Ortho-Glass and 2 layer wrap  Post-procedure details:     Pain:  Unchanged    Sensation:  Normal    Patient tolerance of procedure: Tolerated well, no immediate complications          ED Course  ED Course as of 03/01/22 2353   Tue Mar 01, 2022   2217 Blood Pressure: 136/91   2217 Temperature: 99 1 °F (37 3 °C)   2217 Temp Source: Oral   2217 Pulse(!): 131   2217 Respirations: 20   2217 SpO2: 96 %   2229 Will evaluate patient for fracture with x-ray of wrist and hand  Left patient follow-up with hand after discharge  Will likely splint patient's hand  Patient has no questions or concerns at this time  Will frequently re-evaluate  Most likely fracture to the 4th and 5th metacarpal bones on the right  Patient states he is up-to-date on tetanus shot    4430 Will put patient in ulnar gutter splint for boxer fracture  Will have patient follow up with ortho for further evaualtion  2348 Patient in ulnar gutter splint  Informed him of metacarpal fracture  Advised him to follow-up with primary care as well as orthopedic for re-evaluation  Advised him to continue with over-the-counter medications for his pain  Patient is aware he has no questions or concerns at this time stable for discharge  MDM    Disposition  Final diagnoses:   Metacarpal bone fracture     Time reflects when diagnosis was documented in both MDM as applicable and the Disposition within this note     Time User Action Codes Description Comment    3/1/2022 11:41 PM Iglesia Banks Add [U69 [id number]] Metacarpal bone fracture       ED Disposition     ED Disposition Condition Date/Time Comment    Discharge Stable Tue Mar 1, 2022 11:41 PM Britany Garza discharge to home/self care              Follow-up Information     Follow up With Specialties Details Why Contact Info Additional Information    Franklin Arora MD General Practice Schedule an appointment as soon as possible for a visit  for follow up [address] [facility]  [address] [address] [address] Emergency Department Emergency Medicine Go to  As needed, If symptoms worsen [facility] [phone] [address] [facility] Emergency Department, [address] , Jacinta Acosta MD Orthopedic Surgery, Hand Surgery Schedule an appointment as soon as possible for a visit  for follow up [facility] [address]             Patient's Medications   Discharge Prescriptions    No medications on file     No discharge procedures on file  PDMP Review     None           ED Provider  Attending physically available and evaluated Gonzalo Lechuga  I managed the patient along with the ED Attending      Electronically Signed by         Ryan Spangler DO  03/01/22 2195

## 2022-03-02 NOTE — ED NOTES
AVS reviewed with pt by provider, pt verbalized understanding of d/c instructions  Splint placed by resident and tech   VSS Pt left ambulatory with steady; alert and oriented      Bianca Campbell RN  03/02/22 0003

## 2022-03-07 ENCOUNTER — OFFICE VISIT (OUTPATIENT)
Dept: OBGYN CLINIC | Facility: CLINIC | Age: 32
End: 2022-03-07
Payer: COMMERCIAL

## 2022-03-07 ENCOUNTER — TELEPHONE (OUTPATIENT)
Dept: OBGYN CLINIC | Facility: MEDICAL CENTER | Age: 32
End: 2022-03-07

## 2022-03-07 ENCOUNTER — APPOINTMENT (OUTPATIENT)
Dept: RADIOLOGY | Facility: CLINIC | Age: 32
End: 2022-03-07
Payer: COMMERCIAL

## 2022-03-07 VITALS — BODY MASS INDEX: 28.83 KG/M2 | WEIGHT: 195.2 LBS | SYSTOLIC BLOOD PRESSURE: 140 MMHG | DIASTOLIC BLOOD PRESSURE: 80 MMHG

## 2022-03-07 DIAGNOSIS — S62.326A CLOSED DISPLACED FRACTURE OF SHAFT OF FIFTH METACARPAL BONE OF RIGHT HAND, INITIAL ENCOUNTER: Primary | ICD-10-CM

## 2022-03-07 DIAGNOSIS — T14.8XXA FRACTURE: ICD-10-CM

## 2022-03-07 PROCEDURE — 73130 X-RAY EXAM OF HAND: CPT

## 2022-03-07 PROCEDURE — 99203 OFFICE O/P NEW LOW 30 MIN: CPT | Performed by: ORTHOPAEDIC SURGERY

## 2022-03-07 NOTE — PROGRESS NOTES
ASSESSMENT/PLAN:    Assessment:   Right small finger metacarpal fracture sustained on 3/1/2022    Plan:   Conservative versus surgical treatment options were discussed with patient today  The patient would like to proceed with conservative treatment management at this time  It was discussed with the patient that if his fracture does move on x-ray at his follow-up in 2 weeks that surgical intervention may be required  Patient expressed understanding  The patient is willing to accept the risk of loss of hyperextension of his small finger as well as cosmetic deformity of the dropped knuckle  He expressed understanding  Follow Up:  2  week(s)    To Do Next Visit:  X-rays of the  right  hand in cast    General Discussions:     Fracture - Nonoperative Care: The physiology of a fractured bone was discussed with the patient today  With nondisplaced or minimally displaced fractures, conservative treatment often results in a functional recovery  Typically, these fractures are immobilized in either a cast or splint depending on the pattern  Radiographs are typically taken at intervals throughout the fracture healing to ensure that muscular forces do not cause loss of reduction or alignment  If the fracture loses its alignment, surgical intervention may be required to stabilize it  Medical conditions such as diabetes, osteoporosis, vitamin D deficiency, and a history of or exposure to smoking may delay or prevent fracture healing  Operative Discussions:       _____________________________________________________  CHIEF COMPLAINT:  Chief Complaint   Patient presents with    Right Hand - Fracture     XR 3/1/22         SUBJECTIVE:  Delano Nuno is a 32 y o  male who presents with Fracture to the right hand  This started  6 day(s) ago as Due to a personal injury  Patient states that he punched a wall     Radiation: Yes to the  hand  Previous Treatments: ibuprofen and ED and was placed in a splint with only partial relief  Associated symptoms: No Complaints  Handedness: ambidextrous   Work status: [employer]     PAST MEDICAL HISTORY:  Past Medical History:   Diagnosis Date    Anxiety     Hepatitis C        PAST SURGICAL HISTORY:  Past Surgical History:   Procedure Laterality Date    WISDOM TOOTH EXTRACTION         FAMILY HISTORY:  Family History   Adopted: Yes       SOCIAL HISTORY:  Social History     Tobacco Use    Smoking status: Former Smoker     Packs/day: 0 50     Types: E-Cigarettes, Cigarettes     Quit date: 2021     Years since quittin 5    Smokeless tobacco: Former User   Vaping Use    Vaping Use: Never used   Substance Use Topics    Alcohol use: Never    Drug use: Yes     Types: Heroin     Comment: 1/2 bag daily       MEDICATIONS:  No current outpatient medications on file  ALLERGIES:  No Known Allergies    REVIEW OF SYSTEMS:  Pertinent items are noted in HPI      LABS:  HgA1c: No results found for: HGBA1C  BMP: No results found for: GLUCOSE, CALCIUM, NA, K, CO2, CL, BUN, CREATININE      _____________________________________________________  PHYSICAL EXAMINATION:  Vital signs: /80   Wt 88 5 kg (195 lb 3 2 oz)   BMI 28 83 kg/m²   General: well developed and well nourished, alert, oriented times 3 and appears comfortable  Psychiatric: Normal  HEENT: Trachea Midline, No torticollis  Cardiovascular: No discernable arrhythmia  Pulmonary: No wheezing or stridor  Abdomen: No rebound or guarding  Extremities: No peripheral edema  Skin: No Erythema  Neurovascular: Sensation Intact to the Median, Ulnar, Radial Nerve, Motor Intact to the Median, Ulnar, Radial Nerve and Pulses Intact    MUSCULOSKELETAL EXAMINATION:  RIGHT SIDE:  hand: dropped small finger MCP jt knuckle, no malrotation, full composite fist formation, NVI, tendon intact    _____________________________________________________  STUDIES REVIEWED:  Images were reviewed in PACS by Dr Hardik Arechiga and demonstrate: xray of right hand on 3/7/2022 demonstrates a displaced right small finger metacarpal fracture         PROCEDURES PERFORMED:  Fracture / Dislocation Treatment    Date/Time: 3/7/2022 3:33 PM  Performed by: Jearldine Goodell, MD  Authorized by: Jearldine Goodell, MD     Patient Location:  Clinic  Verbal consent obtained?: Yes    Risks and benefits: Risks, benefits and alternatives were discussed    Consent given by:  Patient  Site marked: Yes    Injury location:  Hand  Location details:  Right hand  Injury type:  Fracture  Manipulation performed?: No    Immobilization:  Cast  Cast type:  Short arm (modified)  Supplies used:  Cotton padding and fiberglass  Neurovascular status: Neurovascularly intact    Patient tolerance:  Patient tolerated the procedure well with no immediate complications          Scribe Attestation    I,:   am acting as a scribe while in the presence of the attending physician :       I,:   personally performed the services described in this documentation    as scribed in my presence :

## 2022-03-21 ENCOUNTER — APPOINTMENT (OUTPATIENT)
Dept: RADIOLOGY | Facility: CLINIC | Age: 32
End: 2022-03-21
Payer: COMMERCIAL

## 2022-03-21 ENCOUNTER — OFFICE VISIT (OUTPATIENT)
Dept: OBGYN CLINIC | Facility: CLINIC | Age: 32
End: 2022-03-21

## 2022-03-21 VITALS
DIASTOLIC BLOOD PRESSURE: 70 MMHG | BODY MASS INDEX: 28.88 KG/M2 | HEIGHT: 69 IN | SYSTOLIC BLOOD PRESSURE: 156 MMHG | WEIGHT: 195 LBS

## 2022-03-21 DIAGNOSIS — S62.326A CLOSED DISPLACED FRACTURE OF SHAFT OF FIFTH METACARPAL BONE OF RIGHT HAND, INITIAL ENCOUNTER: ICD-10-CM

## 2022-03-21 DIAGNOSIS — S62.326A CLOSED DISPLACED FRACTURE OF SHAFT OF FIFTH METACARPAL BONE OF RIGHT HAND, INITIAL ENCOUNTER: Primary | ICD-10-CM

## 2022-03-21 PROCEDURE — 99213 OFFICE O/P EST LOW 20 MIN: CPT | Performed by: PHYSICIAN ASSISTANT

## 2022-03-21 PROCEDURE — 73130 X-RAY EXAM OF HAND: CPT

## 2022-03-25 NOTE — PROGRESS NOTES
ASSESSMENT/PLAN:    Assessment:   Fracture right small finger metacarpal shaft - progressing angulation    Plan:   Discussion had with patient that he is having increased angulation of the right small finger  Discussed with him that at this time, operative fixation would be recommended, as he may lose terminal strength in the small finger  Patient does not feel that he wants to pursue operative intervention at this time  He states that he cannot continue to take more time off of work  He would like to continue to allow this to heal in the cast as is and see how the hand does  Discussion had with patient that if there are limitations to his hand due to the fracture healing position, could consider a revision surgery at a later time to "re-fracture", reduce, and plate the fracture    Follow Up:  1  week(s)    To Do Next Visit:  X-rays of the  right  hand and Cast/splint off prior to x-ray    General Discussions:  Fracture - Nonoperative Care: The physiology of a fractured bone was discussed with the patient today  With nondisplaced or minimally displaced fractures, conservative treatment often results in a functional recovery  Typically, these fractures are immobilized in either a cast or splint depending on the pattern  Radiographs are typically taken at intervals throughout the fracture healing to ensure that muscular forces do not cause loss of reduction or alignment  If the fracture loses its alignment, surgical intervention may be required to stabilize it  Medical conditions such as diabetes, osteoporosis, vitamin D deficiency, and a history of or exposure to smoking may delay or prevent fracture healing       _____________________________________________________  CHIEF COMPLAINT:  Chief Complaint   Patient presents with    Right Hand - Fracture         SUBJECTIVE:  Rey Haji is a 32 y o  male who presents for follow up regarding Right small finger metacarpal shaft fracture (3/1/2022)   He is currently being treated non-operatively in a cast  He denies any new injuries  He denies any significant pain  He denies any acute complaints  PAST MEDICAL HISTORY:  Past Medical History:   Diagnosis Date    Anxiety     Hepatitis C        PAST SURGICAL HISTORY:  Past Surgical History:   Procedure Laterality Date    WISDOM TOOTH EXTRACTION         FAMILY HISTORY:  Family History   Adopted: Yes       SOCIAL HISTORY:  Social History     Tobacco Use    Smoking status: Former Smoker     Packs/day: 0 50     Types: E-Cigarettes, Cigarettes     Quit date: 2021     Years since quittin 5    Smokeless tobacco: Former User   Vaping Use    Vaping Use: Never used   Substance Use Topics    Alcohol use: Never    Drug use: Yes     Types: Heroin     Comment:  bag daily       MEDICATIONS:  No current outpatient medications on file  ALLERGIES:  No Known Allergies    REVIEW OF SYSTEMS:  Pertinent items are noted in HPI  A comprehensive review of systems was negative      LABS:  HgA1c: No results found for: HGBA1C  BMP: No results found for: GLUCOSE, CALCIUM, NA, K, CO2, CL, BUN, CREATININE        _____________________________________________________  PHYSICAL EXAMINATION:  Vital signs: /70   Ht 5' 9" (1 753 m)   Wt 88 5 kg (195 lb)   BMI 28 80 kg/m²   General: well developed and well nourished, alert, oriented times 3 and appears comfortable  Psychiatric: Normal  HEENT: Trachea Midline, No torticollis  Cardiovascular: No discernable arrhythmia  Pulmonary: No wheezing or stridor  Abdomen: No rebound or guarding  Extremities: No peripheral edema  Skin: No masses, erythema, lacerations, fluctation, ulcerations  Neurovascular: Sensation Intact to the Median, Ulnar, Radial Nerve, Motor Intact to the Median, Ulnar, Radial Nerve and Pulses Intact    MUSCULOSKELETAL EXAMINATION:  Right Hand: examined in cast   Patient has continued ROM capabilities within the confines of the cast  Sensation intact  Brisk capillary refill      _____________________________________________________  STUDIES REVIEWED:  Images were reviewed in PACS by Dr Roger Torre and demonstrate: increased angulation seen at the small finger metacarpal shaft fracture      PROCEDURES PERFORMED:  Procedures  No Procedures performed today

## 2022-03-28 ENCOUNTER — APPOINTMENT (OUTPATIENT)
Dept: RADIOLOGY | Facility: CLINIC | Age: 32
End: 2022-03-28
Payer: COMMERCIAL

## 2022-03-28 ENCOUNTER — OFFICE VISIT (OUTPATIENT)
Dept: OCCUPATIONAL THERAPY | Facility: CLINIC | Age: 32
End: 2022-03-28
Payer: COMMERCIAL

## 2022-03-28 ENCOUNTER — OFFICE VISIT (OUTPATIENT)
Dept: OBGYN CLINIC | Facility: CLINIC | Age: 32
End: 2022-03-28
Payer: COMMERCIAL

## 2022-03-28 VITALS
HEIGHT: 69 IN | BODY MASS INDEX: 27.85 KG/M2 | WEIGHT: 188 LBS | SYSTOLIC BLOOD PRESSURE: 128 MMHG | DIASTOLIC BLOOD PRESSURE: 68 MMHG

## 2022-03-28 DIAGNOSIS — S62.326D CLOSED DISPLACED FRACTURE OF SHAFT OF FIFTH METACARPAL BONE OF RIGHT HAND WITH ROUTINE HEALING, SUBSEQUENT ENCOUNTER: ICD-10-CM

## 2022-03-28 DIAGNOSIS — S62.326D CLOSED DISPLACED FRACTURE OF SHAFT OF FIFTH METACARPAL BONE OF RIGHT HAND WITH ROUTINE HEALING, SUBSEQUENT ENCOUNTER: Primary | ICD-10-CM

## 2022-03-28 PROCEDURE — 73130 X-RAY EXAM OF HAND: CPT

## 2022-03-28 PROCEDURE — 97760 ORTHOTIC MGMT&TRAING 1ST ENC: CPT | Performed by: OCCUPATIONAL THERAPIST

## 2022-03-28 PROCEDURE — 99213 OFFICE O/P EST LOW 20 MIN: CPT | Performed by: ORTHOPAEDIC SURGERY

## 2022-03-28 PROCEDURE — 1036F TOBACCO NON-USER: CPT | Performed by: ORTHOPAEDIC SURGERY

## 2022-03-28 PROCEDURE — 3008F BODY MASS INDEX DOCD: CPT | Performed by: ORTHOPAEDIC SURGERY

## 2022-03-28 RX ORDER — BUPRENORPHINE AND NALOXONE 8; 2 MG/1; MG/1
FILM, SOLUBLE BUCCAL; SUBLINGUAL
COMMUNITY
Start: 2022-03-15

## 2022-03-28 RX ORDER — IBUPROFEN 800 MG/1
800 TABLET ORAL EVERY 6 HOURS PRN
COMMUNITY
Start: 2022-03-08

## 2022-03-28 RX ORDER — AMOXICILLIN 500 MG/1
500 CAPSULE ORAL EVERY 8 HOURS
COMMUNITY
Start: 2022-03-16

## 2022-03-28 NOTE — PROGRESS NOTES
Orthosis    Diagnosis:   1  Closed displaced fracture of shaft of fifth metacarpal bone of right hand with routine healing, subsequent encounter  Ambulatory Referral to PT/OT Hand Therapy     Indication: Fracture    Location: Right  hand  Supplies: Custom Fit Orthotic  Orthosis type: cuff splint  Wearing Schedule: As Needed  Describe Position: function    Precautions: Fracture    Patient or Caregiver expresses understanding of wearing Schedule and Precautions? Yes  Patient or Caregiver able to don/doff orthotic independently? Yes    Written orders provided to patient?  Yes  Orders Obtained: Written  Orders Obtained from: Dr Roger Olsen      Return for evaluation and treatment No

## 2022-03-28 NOTE — LETTER
March 28, 2022     Patient: Vito Hallman   YOB: 1990   Date of Visit: 3/28/2022       To Whom it May Concern: Vito Hallman is under my professional care  He was seen in my office on 3/28/2022  He may return to work on 3/29/2022 without restrictions  If you have any questions or concerns, please don't hesitate to call           Sincerely,          Bubba Friday, MD        CC: No Recipients

## 2022-03-28 NOTE — PROGRESS NOTES
ASSESSMENT/PLAN:    Assessment:   Fracture right small finger metacarpal shaft sustained on 3/1/2022 treated conservatively     Plan: It was discussed with the patient that it will take another 3-4 weeks until he is fully healed  Gradual return activities was discussed  Patient will meet with the OT in the office today for a metacarpal cuff splint  He will continue to wear his metacarpal cuff splint while at work  Patient is returning to work without restriction as of tomorrow  However if he notices difficulties return to work without restrictions he will give our office a call we will amend his work note  Follow Up:  PRN    To Do Next Visit:       General Discussions:     Fracture - Nonoperative Care: The physiology of a fractured bone was discussed with the patient today  With nondisplaced or minimally displaced fractures, conservative treatment often results in a functional recovery  Typically, these fractures are immobilized in either a cast or splint depending on the pattern  Radiographs are typically taken at intervals throughout the fracture healing to ensure that muscular forces do not cause loss of reduction or alignment  If the fracture loses its alignment, surgical intervention may be required to stabilize it  Medical conditions such as diabetes, osteoporosis, vitamin D deficiency, and a history of or exposure to smoking may delay or prevent fracture healing  Operative Discussions:       _____________________________________________________  CHIEF COMPLAINT:  Chief Complaint   Patient presents with    Right Hand - Fracture, Follow-up         SUBJECTIVE:  Jamee Georges is a 32 y o  male who presents for follow up regarding Fracture right small finger metacarpal shaft sustained on 3/1/2022 treated conservatively  Since last visit, Jamee Georges has tried casting with only partial relief    Today there is Stiffness/LROM to the right wrist     Radiation: None  Associated symptoms: No Complaints  Handedness: right   Work: [employer]     PAST MEDICAL HISTORY:  Past Medical History:   Diagnosis Date    Anxiety     Hepatitis C        PAST SURGICAL HISTORY:  Past Surgical History:   Procedure Laterality Date    WISDOM TOOTH EXTRACTION         FAMILY HISTORY:  Family History   Adopted: Yes       SOCIAL HISTORY:  Social History     Tobacco Use    Smoking status: Former Smoker     Packs/day: 0 50     Types: E-Cigarettes, Cigarettes     Quit date: 2021     Years since quittin 5    Smokeless tobacco: Former User   Vaping Use    Vaping Use: Never used   Substance Use Topics    Alcohol use: Never    Drug use: Yes     Types: Heroin     Comment: 1/2 bag daily       MEDICATIONS:    Current Outpatient Medications:     amoxicillin (AMOXIL) 500 mg capsule, Take 500 mg by mouth every 8 (eight) hours, Disp: , Rfl:     buprenorphine-naloxone (Suboxone) 8-2 mg, , Disp: , Rfl:     ibuprofen (MOTRIN) 800 mg tablet, Take 800 mg by mouth every 6 (six) hours as needed, Disp: , Rfl:     ALLERGIES:  No Known Allergies    REVIEW OF SYSTEMS:  Pertinent items are noted in HPI      LABS:  HgA1c: No results found for: HGBA1C  BMP: No results found for: GLUCOSE, CALCIUM, NA, K, CO2, CL, BUN, CREATININE        _____________________________________________________  PHYSICAL EXAMINATION:  Vital signs: Ht 5' 9" (1 753 m)   Wt 85 3 kg (188 lb)   BMI 27 76 kg/m²   General: well developed and well nourished, alert, oriented times 3 and appears comfortable  Psychiatric: Normal  HEENT: Trachea Midline, No torticollis  Cardiovascular: No discernable arrhythmia  Pulmonary: No wheezing or stridor  Abdomen: No rebound or guarding  Extremities: No peripheral edema  Skin: No Erythema  Neurovascular: Sensation Intact to the Median, Ulnar, Radial Nerve, Motor Intact to the Median, Ulnar, Radial Nerve and Pulses Intact    MUSCULOSKELETAL EXAMINATION:  RIGHT SIDE:  90% composite fist formation, no malrotation, dropped knuckle appearance, flexor and extensor tendons intact    _____________________________________________________  STUDIES REVIEWED:  Images were reviewed in PACS by Dr Seleta Habermann and demonstrate: xray of right hand on 3/28/2022 demonstrates callus formation around small finger metacarpal fracture with acceptable alignment         PROCEDURES PERFORMED:  Procedures  No Procedures performed today   Scribe Attestation    I,:  Satya Kirkland am acting as a scribe while in the presence of the attending physician :       I,:  Ivone Hernandez MD personally performed the services described in this documentation    as scribed in my presence :

## 2022-04-22 ENCOUNTER — OFFICE VISIT (OUTPATIENT)
Dept: GASTROENTEROLOGY | Facility: CLINIC | Age: 32
End: 2022-04-22
Payer: COMMERCIAL

## 2022-04-22 VITALS
WEIGHT: 185 LBS | SYSTOLIC BLOOD PRESSURE: 158 MMHG | BODY MASS INDEX: 27.4 KG/M2 | HEIGHT: 69 IN | DIASTOLIC BLOOD PRESSURE: 90 MMHG

## 2022-04-22 DIAGNOSIS — B18.2 CHRONIC HEPATITIS C WITHOUT HEPATIC COMA (HCC): Primary | ICD-10-CM

## 2022-04-22 PROCEDURE — 99214 OFFICE O/P EST MOD 30 MIN: CPT | Performed by: NURSE PRACTITIONER

## 2022-04-22 PROCEDURE — 3008F BODY MASS INDEX DOCD: CPT | Performed by: ORTHOPAEDIC SURGERY

## 2022-04-22 RX ORDER — ARIPIPRAZOLE 5 MG/1
5 TABLET ORAL DAILY
COMMUNITY

## 2022-04-22 NOTE — LETTER
April 22, 2022     Fatmata Lui MD  [address]  [address] [facility]  [address] [facility] [address]    Patient: Aurora Parra   YOB: 1990   Date of Visit: 4/22/2022       Dear Dr Arreola Sides:    Thank you for referring Aurora Parra to me for evaluation  Below are my notes for this consultation  If you have questions, please do not hesitate to call me  I look forward to following your patient along with you  Sincerely,        DADA Goff        CC: No Recipients  Sudha Jefferson, [address]   4/22/2022  5:01 PM  Sign when Signing Visit  [facility] - Outpatient Follow-up Note  Aurora Parra 32 y o  male MRN: [id number]  Encounter: [id number]    ASSESSMENT AND PLAN:      1  Chronic hepatitis C without hepatic coma (HCC)  2  Elevated LFTs   History of hep C diagnosed on blood work in 2019  Not previously treated  Recent blood work revealed HCV RNA (PCR) - 260,  Genotype not indicated   LFTs also noted to be elevated  No recent abdominal imaging   Patient likely cleared virus without treatment but would expect viral load to be minimal  Recommend patient follow-up with [facility] hepatologist Dr Lanrdy Coronado for further evaluation and treatment recommendation    Followup Appointment:  After evaluated by hepatology  ______________________________________________________________________    Chief Complaint   Patient presents with    Hepatitis C     HPI:  Patient reports history of Hepatitis C diagnosed on blood work in 2019 - not treated previously  He has been incarcerated several times and  has a history of previous IV drug abuse (heroin)  He also has several tattoos  Blood work obtained 03/14/2022 was reviewed- HCV RNA (PCR) 260,  Hep B surface antibody was reactive, Hep BE antigen negative, Hep A antibody negative  LFT's also elevated with total bilirubin 1 0, AST 51, ALT 58 and alkaline phosphatase 219      No recent abdominal imaging     He denies any fever, chills, upper GI symptoms, jaundice, abdominal pain, nausea, vomiting, change in bowel habits, hematochezia, or  melena  Historical Information   Past Medical History:   Diagnosis Date    Anxiety     Hepatitis C      Past Surgical History:   Procedure Laterality Date    WISDOM TOOTH EXTRACTION       Social History     Substance and Sexual Activity   Alcohol Use Never     Social History     Substance and Sexual Activity   Drug Use Yes    Types: Heroin    Comment: 1/2 bag daily     Social History     Tobacco Use   Smoking Status Former Smoker    Packs/day: 0 50    Types: E-Cigarettes, Cigarettes    Quit date: 2021    Years since quittin 6   Smokeless Tobacco Former User     Family History   Adopted: Yes   Family history unknown: Yes         Current Outpatient Medications:     ARIPiprazole (ABILIFY) 5 mg tablet    buprenorphine-naloxone (Suboxone) 8-2 mg    ibuprofen (MOTRIN) 800 mg tablet    lamoTRIgine (LAMICTAL PO)    amoxicillin (AMOXIL) 500 mg capsule  No Known Allergies  Reviewed medications and allergies and updated as indicated    PHYSICAL EXAM:    Blood pressure 158/90, height 5' 9" (1 753 m), weight 83 9 kg (185 lb)  Body mass index is 27 32 kg/m²  General Appearance: NAD, cooperative, alert  Eyes: Anicteric  ENT:  Normocephalic, atraumatic, normal mucosa  Neck:  Supple, symmetrical, trachea midline  Resp:  Clear to auscultation bilaterally; no rales, rhonchi or wheezing; respirations unlabored   CV:  S1 S2, Regular rate and rhythm; no murmur, rub, or gallop  GI:  Soft, non-tender, non-distended; normal bowel sounds; no masses, no organomegaly   Rectal: Deferred  Musculoskeletal: No cyanosis, clubbing or edema  Normal ROM    Skin:  No jaundice, rashes, or lesions   Heme/Lymph: No palpable cervical lymphadenopathy  Psych: Normal affect, good eye contact  Neuro: No gross deficits, AAOx3

## 2022-04-22 NOTE — PROGRESS NOTES
[facility] - Outpatient Follow-up Note  Amado Copper 32 y o  male MRN: [id number]  Encounter: [id number]    ASSESSMENT AND PLAN:      1  Chronic hepatitis C without hepatic coma (HCC)  2  Elevated LFTs   History of hep C diagnosed on blood work in 2019  Not previously treated  Recent blood work revealed HCV RNA (PCR) - 260,  Genotype not indicated   LFTs also noted to be elevated  No recent abdominal imaging   Patient likely cleared virus without treatment but would expect viral load to be minimal  Recommend patient follow-up with [facility] hepatologist Dr Kush Fine for further evaluation and treatment recommendation    Followup Appointment:  After evaluated by hepatology  ______________________________________________________________________    Chief Complaint   Patient presents with    Hepatitis C     HPI:  Patient reports history of Hepatitis C diagnosed on blood work in 2019 - not treated previously  He has been incarcerated several times and  has a history of previous IV drug abuse (heroin)  He also has several tattoos  Blood work obtained 03/14/2022 was reviewed- HCV RNA (PCR) 260,  Hep B surface antibody was reactive, Hep BE antigen negative, Hep A antibody negative  LFT's also elevated with total bilirubin 1 0, AST 51, ALT 58 and alkaline phosphatase 219  No recent abdominal imaging     He denies any fever, chills, upper GI symptoms, jaundice, abdominal pain, nausea, vomiting, change in bowel habits, hematochezia, or  melena           Historical Information   Past Medical History:   Diagnosis Date    Anxiety     Hepatitis C      Past Surgical History:   Procedure Laterality Date    WISDOM TOOTH EXTRACTION       Social History     Substance and Sexual Activity   Alcohol Use Never     Social History     Substance and Sexual Activity   Drug Use Yes    Types: Heroin    Comment: 1/2 bag daily     Social History     Tobacco Use   Smoking Status Former Smoker    Packs/day: 0 50    Types: E-Cigarettes, Cigarettes    Quit date: 2021    Years since quittin 6   Smokeless Tobacco Former User     Family History   Adopted: Yes   Family history unknown: Yes         Current Outpatient Medications:     ARIPiprazole (ABILIFY) 5 mg tablet    buprenorphine-naloxone (Suboxone) 8-2 mg    ibuprofen (MOTRIN) 800 mg tablet    lamoTRIgine (LAMICTAL PO)    amoxicillin (AMOXIL) 500 mg capsule  No Known Allergies  Reviewed medications and allergies and updated as indicated    PHYSICAL EXAM:    Blood pressure 158/90, height 5' 9" (1 753 m), weight 83 9 kg (185 lb)  Body mass index is 27 32 kg/m²  General Appearance: NAD, cooperative, alert  Eyes: Anicteric  ENT:  Normocephalic, atraumatic, normal mucosa  Neck:  Supple, symmetrical, trachea midline  Resp:  Clear to auscultation bilaterally; no rales, rhonchi or wheezing; respirations unlabored   CV:  S1 S2, Regular rate and rhythm; no murmur, rub, or gallop  GI:  Soft, non-tender, non-distended; normal bowel sounds; no masses, no organomegaly   Rectal: Deferred  Musculoskeletal: No cyanosis, clubbing or edema  Normal ROM    Skin:  No jaundice, rashes, or lesions   Heme/Lymph: No palpable cervical lymphadenopathy  Psych: Normal affect, good eye contact  Neuro: No gross deficits, AAOx3

## 2022-04-25 ENCOUNTER — HOSPITAL ENCOUNTER (EMERGENCY)
Facility: HOSPITAL | Age: 32
Discharge: LEFT BEFORE DISCHARGE | End: 2022-04-25
Attending: EMERGENCY MEDICINE
Payer: COMMERCIAL

## 2022-04-25 DIAGNOSIS — T40.1X1A HEROIN OVERDOSE (HCC): Primary | ICD-10-CM

## 2022-04-25 PROCEDURE — NC001 PR NO CHARGE: Performed by: EMERGENCY MEDICINE

## 2022-04-25 PROCEDURE — 99283 EMERGENCY DEPT VISIT LOW MDM: CPT

## 2022-04-25 NOTE — ED NOTES
Patient left ED with a steady gait, no distress noted, A&OX3 while provider was attempting to assess patient        Ilene Cui RN  04/25/22 [id number]

## 2022-04-25 NOTE — ED PROVIDER NOTES
History  No chief complaint on file  Patient is a 19-year-old male brought in by [facility] after an accidental heroin overdose  Given 2 mg of intranasal narcan with resolution of symptoms  Patient requesting to leave now  States his wallet is at the house and will not be staying in the er  Cannot display prior to admission medications because the patient has not been admitted in this contact  Past Medical History:   Diagnosis Date    Anxiety     Hepatitis C        Past Surgical History:   Procedure Laterality Date    WISDOM TOOTH EXTRACTION         Family History   Adopted: Yes   Family history unknown: Yes     I have reviewed and agree with the history as documented  E-Cigarette/Vaping    E-Cigarette Use Current Every Day User      E-Cigarette/Vaping Substances    Nicotine Yes     THC No     CBD No     Flavoring No     Other No     Unknown No      Social History     Tobacco Use    Smoking status: Former Smoker     Packs/day: 0 50     Types: E-Cigarettes, Cigarettes     Quit date: 2021     Years since quittin 6    Smokeless tobacco: Former User   Vaping Use    Vaping Use: Every day    Substances: Nicotine   Substance Use Topics    Alcohol use: Never    Drug use: Yes     Types: Heroin     Comment: 1/2 bag daily       Review of Systems    Physical Exam  Physical Exam    Vital Signs  ED Triage Vitals   Temp Pulse Resp BP SpO2   -- -- -- -- --      Temp src Heart Rate Source Patient Position - Orthostatic VS BP Location FiO2 (%)   -- -- -- -- --      Pain Score       --           There were no vitals filed for this visit        Visual Acuity      ED Medications  Medications - No data to display    Diagnostic Studies  Results Reviewed     None                 No orders to display              Procedures  Procedures         ED Course                                             MDM    Disposition  Final diagnoses:   Heroin overdose ([facility] [address] )     Time reflects when diagnosis was documented in both MDM as applicable and the Disposition within this note     Time User Action Codes Description Comment    4/25/2022  6:58 PM Levelkurt Brower Add [T40 1X1A] Heroin overdose [facility])       ED Disposition     ED Disposition Condition Date/Time Comment    Left from Room before Provider Exam  Mon Apr 25, 2022  6:59 PM       Follow-up Information    None         Patient's Medications   Discharge Prescriptions    No medications on file       No discharge procedures on file      PDMP Review     None          ED Provider  Electronically Signed by           Nola Mosquera MD  04/25/22 [id number]

## 2022-05-28 ENCOUNTER — HOSPITAL ENCOUNTER (EMERGENCY)
Facility: HOSPITAL | Age: 32
Discharge: HOME | End: 2022-05-28
Attending: EMERGENCY MEDICINE | Admitting: EMERGENCY MEDICINE
Payer: COMMERCIAL

## 2022-05-28 VITALS
BODY MASS INDEX: 28.8 KG/M2 | WEIGHT: 195 LBS | HEART RATE: 85 BPM | OXYGEN SATURATION: 95 % | RESPIRATION RATE: 16 BRPM | SYSTOLIC BLOOD PRESSURE: 126 MMHG | DIASTOLIC BLOOD PRESSURE: 70 MMHG | TEMPERATURE: 98.4 F

## 2022-05-28 DIAGNOSIS — T40.1X1A ACCIDENTAL OVERDOSE OF HEROIN, INITIAL ENCOUNTER (HCC): Primary | ICD-10-CM

## 2022-05-28 PROCEDURE — 99284 EMERGENCY DEPT VISIT MOD MDM: CPT

## 2022-05-28 PROCEDURE — 99282 EMERGENCY DEPT VISIT SF MDM: CPT | Performed by: PHYSICIAN ASSISTANT

## 2022-05-28 RX ORDER — NALOXONE HYDROCHLORIDE 1 MG/ML
1 INJECTION PARENTERAL ONCE
Status: COMPLETED | OUTPATIENT
Start: 2022-05-28 | End: 2022-05-28

## 2022-05-28 NOTE — ED NOTES
Per [facility] for PO intake, pt provided with water as per his request       Darian Castillo, CHAI  05/28/22 [id number]

## 2022-05-28 NOTE — ED PROVIDER NOTES
History  Chief Complaint   Patient presents with    Overdose - Accidental     Report used heroin in attempt to get high, girl friend called 911 when pt became unresponsive 2 mg IV narcan pre hospital pt A&O x4     Tadeo Loza is a 32 y o   male with PMH of substance abuse, hepatitis-C who presents to the emergency department with reported overdose  The patient states approximate 1 hour prior to arrival he attempted to get high with approximate 1 5 bags of heroin  States he started  States that he then only remembers getting Narcan and arriving in the emergency department  States 1 5 bags is more than is typical dose  States that drugs off the street and easily could have mixed contaminants  States he is feeling fine now and denies any acute complaints  The patient denies any fevers, chills, dizziness, headaches, chest pain, shortness of breath, palpitations, abdominal pain, nausea, vomiting, diarrhea, lower extremity pain/swelling/edema  States he would like to drink water and then he would like to go  Patient is accompanied by [employer]  Denies suicidal homicidal ideation  He states this has been his approximate 5th overdose in last month                  History provided by:  Patient and police   used: No    Overdose - Accidental  Ingested substance:  Illicit drugs  Time since incident:  1 hour  Ingestion amount:  1 5 back  Witnesses present: yes    Witnessed by:  Partner  Incident location:  Home  Context: not depression, not intentional ingestion, not mental illness, not poisoned, not recreational, not suicide attempt, patient not seen handling substance, substance was not missing and supervised    Associated symptoms: no abdominal pain, no agitation, no altered mental status, no anxiety, no chest pain, no cough, no diaphoresis, no diarrhea, no headaches, no lethargy, no nausea, no shortness of breath, no slurred speech, no visual changes and no vomiting        Prior to Admission Medications   Prescriptions Last Dose Informant Patient Reported? Taking? ARIPiprazole (ABILIFY) 5 mg tablet  Self Yes No   Sig: Take 5 mg by mouth daily   amoxicillin (AMOXIL) 500 mg capsule  Self Yes No   Sig: Take 500 mg by mouth every 8 (eight) hours   Patient not taking: Reported on 2022    buprenorphine-naloxone (Suboxone) 8-2 mg  Self Yes No   ibuprofen (MOTRIN) 800 mg tablet  Self Yes No   Sig: Take 800 mg by mouth every 6 (six) hours as needed     lamoTRIgine (LAMICTAL PO)  Self Yes No   Sig: Take 5 mg by mouth 2 (two) times a day      Facility-Administered Medications: None       Past Medical History:   Diagnosis Date    Anxiety     Hepatitis C        Past Surgical History:   Procedure Laterality Date    WISDOM TOOTH EXTRACTION         Family History   Adopted: Yes   Family history unknown: Yes     I have reviewed and agree with the history as documented  E-Cigarette/Vaping    E-Cigarette Use Current Every Day User      E-Cigarette/Vaping Substances    Nicotine Yes     THC No     CBD No     Flavoring No     Other No     Unknown No      Social History     Tobacco Use    Smoking status: Former Smoker     Packs/day: 0 50     Types: E-Cigarettes, Cigarettes     Quit date: 2021     Years since quittin 7    Smokeless tobacco: Former User   Vaping Use    Vaping Use: Every day    Substances: Nicotine   Substance Use Topics    Alcohol use: Never    Drug use: Yes     Types: Heroin     Comment: 1/2 bag daily       Review of Systems   Constitutional: Negative for activity change, appetite change, chills, diaphoresis, fever and unexpected weight change  HENT: Negative for congestion, ear discharge, postnasal drip, rhinorrhea, sinus pressure, sinus pain and sore throat  Respiratory: Negative for apnea, cough, choking, chest tightness, shortness of breath, wheezing and stridor  Cardiovascular: Negative for chest pain, palpitations and leg swelling     Gastrointestinal: Negative for abdominal pain, blood in stool, constipation, diarrhea, nausea and vomiting  Genitourinary: Negative for dysuria, flank pain, frequency and urgency  Musculoskeletal: Negative for arthralgias, myalgias and neck stiffness  Skin: Negative for color change, pallor, rash and wound  Neurological: Negative for dizziness, tremors, seizures, syncope, light-headedness, numbness and headaches  Psychiatric/Behavioral: Negative for agitation, self-injury and suicidal ideas  The patient is not nervous/anxious  All other systems reviewed and are negative  Physical Exam  Physical Exam  Vitals and nursing note reviewed  Constitutional:       General: He is not in acute distress  Appearance: Normal appearance  He is normal weight  He is not ill-appearing or toxic-appearing  HENT:      Head: Normocephalic and atraumatic  Nose: Nose normal       Mouth/Throat:      Mouth: Mucous membranes are moist       Pharynx: Oropharynx is clear  No oropharyngeal exudate  Eyes:      Extraocular Movements: Extraocular movements intact  Pupils: Pupils are equal, round, and reactive to light  Cardiovascular:      Rate and Rhythm: Normal rate and regular rhythm  Pulses: Normal pulses  Heart sounds: Normal heart sounds  No murmur heard  No friction rub  No gallop  Pulmonary:      Effort: Pulmonary effort is normal  No respiratory distress  Breath sounds: Normal breath sounds  No stridor  No wheezing, rhonchi or rales  Abdominal:      General: Abdomen is flat  Bowel sounds are normal  There is no distension  Palpations: Abdomen is soft  There is no mass  Tenderness: There is no abdominal tenderness  There is no guarding or rebound  Hernia: No hernia is present  Musculoskeletal:         General: No swelling, tenderness or deformity  Normal range of motion  Cervical back: Normal range of motion  No rigidity or tenderness  Skin:     General: Skin is warm and dry  Capillary Refill: Capillary refill takes less than 2 seconds  Neurological:      General: No focal deficit present  Mental Status: He is alert and oriented to person, place, and time  Mental status is at baseline  Cranial Nerves: No cranial nerve deficit  Sensory: No sensory deficit  Motor: No weakness  Vital Signs  ED Triage Vitals [05/28/22 1838]   Temperature Pulse Respirations Blood Pressure SpO2   98 4 °F (36 9 °C) 85 16 140/87 98 %      Temp Source Heart Rate Source Patient Position - Orthostatic VS BP Location FiO2 (%)   Oral Monitor Lying Left arm --      Pain Score       No Pain           Vitals:    05/28/22 1838   BP: 140/87   Pulse: 85   Patient Position - Orthostatic VS: Lying         Visual Acuity      ED Medications  Medications   naloxone (FOR EMS ONLY) (NARCAN) 2 MG/2ML injection 2 mg (0 mg Does not apply Given to EMS 5/28/22 1913)       Diagnostic Studies  Results Reviewed     None                 No orders to display              Procedures  Procedures         ED Course           SBIRT 22yo+    Flowsheet Row Most Recent Value   SBIRT (25 yo +)    In order to provide better care to our patients, we are screening all of our patients for alcohol and drug use  Would it be okay to ask you these screening questions? No Filed at: 05/28/2022 1914                    MDM  Number of Diagnoses or Management Options  Accidental overdose of heroin, initial encounter [facility]): new and does not require workup  Diagnosis management comments: Patient was seen and examined  in the emergency department for chief complaint of overdose  The patient presented approximately 1 hour after snorting 1 5 bags of heroin  States more than is usual amount  Does have Narcan at home  This is not intent to harm self  He has no intention of harming others  He is actually turning himself in no police for warrants  He currently has no complaints    On exam patient is in no acute distress, lungs are clear, regular rate rhythm, no murmurs rubs or gallops  Abdomen is soft nontender nondistended  No lower extremity edema  Moving all 4 extremities well  Pupils equal round reactive to light  GCS 15  CN 2-12 intact  PERRLA  Bilateral upper and lower extremities have 5/5 strength and sensation is intact  Finger to Nose and Heel to shin are intact  Speech normal   Left hand in handcuffs  DDx including but not limited to: intentional overdose, accidental overdose,depression, suicide attempt, substance abuse, aspiration pneumonitis, cardiac or metabolic etiology, intracranial etiology  Workup:  Offered workup to patient  States he would just like a drink of water like to go  I discussed risks of leaving at this point for further overdose and repeat respiratory depression which could lead to neurologic injury and even death  We discussed other overdose syndromes as well as the possibility that could experience a toxidrome due to mixed substance  He would still like to be discharged at this point and go to [facility] with [employer]  Will p o  Challenge discharge  Will not administer community Narcan as patient is being discharged with police  Disposition:  General impression 35-year-old male after accidental overdose on heroin  Patient has no complaints on arrival is alert oriented person place time situation  Neuro exam is intact  Patient wishes to be discharged on arrival   He is common cooperative  He will be discharged to incarceration with a [employer]  Return precautions discussed  The patient was discharged in stable condition  Patient ambulated off the department  Extensive return to emergency department precautions were discussed  Follow up with appropriate providers including primary care physician was discussed  Patient and/or their  primary decision maker expressed understanding  Patient remained stable during entire emergency department stay        Risk of Complications, Morbidity, and/or Mortality  Presenting problems: high  Diagnostic procedures: moderate  Management options: moderate    Patient Progress  Patient progress: stable      Disposition  Final diagnoses:   Accidental overdose of heroin, initial encounter [facility])     Time reflects when diagnosis was documented in both MDM as applicable and the Disposition within this note     Time User Action Codes Description Comment    5/28/2022  7:27 PM [address] Accidental overdose of heroin, initial encounter [facility])       ED Disposition     ED Disposition   Discharge    Condition   Stable    Date/Time   Sat May 28, 2022  7:30 PM    Comment   Isabelle Castaneda discharge to home/self care  Follow-up Information     Follow up With Specialties Details Why Contact Info Additional [address] [facility] Emergency Department Emergency Medicine Go to  As needed, If symptoms worsen [facility] [id number]  [address] [facility] Emergency Department, [address] [place] [address] 200  Call  To schedule an appointment with a primary care physician [facility], [address] [place] [address]  [phone]             Patient's Medications   Discharge Prescriptions    No medications on file       No discharge procedures on file      PDMP Review     None          ED Provider  Electronically Signed by           Kirti Johnson PA-C  05/28/22 2679

## 2022-05-28 NOTE — DISCHARGE INSTRUCTIONS
You were seen in the emergency department today for heroin overdose  Please follow-up with your primary care physician regarding your symptoms  Please return to the emergency department with any worsening symptoms including but not limited to:  Respiratory depression, chest pain, shortness a breath, abdominal pain, vomiting, diarrhea, decreased level consciousness  Patient is medically cleared for discharge to incarceration at this point  At risk for repeat overdose